# Patient Record
Sex: FEMALE | Race: WHITE | NOT HISPANIC OR LATINO | Employment: FULL TIME | URBAN - METROPOLITAN AREA
[De-identification: names, ages, dates, MRNs, and addresses within clinical notes are randomized per-mention and may not be internally consistent; named-entity substitution may affect disease eponyms.]

---

## 2023-04-06 LAB
ALANINE AMINOTRANSFERASE (SGPT) (U/L) IN SER/PLAS: 17 U/L (ref 7–45)
ALBUMIN (G/DL) IN SER/PLAS: 4.2 G/DL (ref 3.4–5)
ALKALINE PHOSPHATASE (U/L) IN SER/PLAS: 52 U/L (ref 33–110)
ANION GAP IN SER/PLAS: 14 MMOL/L (ref 10–20)
ASPARTATE AMINOTRANSFERASE (SGOT) (U/L) IN SER/PLAS: 17 U/L (ref 9–39)
BILIRUBIN TOTAL (MG/DL) IN SER/PLAS: 0.3 MG/DL (ref 0–1.2)
CALCIUM (MG/DL) IN SER/PLAS: 9.7 MG/DL (ref 8.6–10.6)
CARBON DIOXIDE, TOTAL (MMOL/L) IN SER/PLAS: 25 MMOL/L (ref 21–32)
CHLORIDE (MMOL/L) IN SER/PLAS: 104 MMOL/L (ref 98–107)
CREATININE (MG/DL) IN SER/PLAS: 0.85 MG/DL (ref 0.5–1.05)
ERYTHROCYTE DISTRIBUTION WIDTH (RATIO) BY AUTOMATED COUNT: 12.6 % (ref 11.5–14.5)
ERYTHROCYTE MEAN CORPUSCULAR HEMOGLOBIN CONCENTRATION (G/DL) BY AUTOMATED: 32.4 G/DL (ref 32–36)
ERYTHROCYTE MEAN CORPUSCULAR VOLUME (FL) BY AUTOMATED COUNT: 92 FL (ref 80–100)
ERYTHROCYTES (10*6/UL) IN BLOOD BY AUTOMATED COUNT: 4.48 X10E12/L (ref 4–5.2)
GFR FEMALE: 86 ML/MIN/1.73M2
GLUCOSE (MG/DL) IN SER/PLAS: 86 MG/DL (ref 74–99)
HEMATOCRIT (%) IN BLOOD BY AUTOMATED COUNT: 41.1 % (ref 36–46)
HEMOGLOBIN (G/DL) IN BLOOD: 13.3 G/DL (ref 12–16)
LEUKOCYTES (10*3/UL) IN BLOOD BY AUTOMATED COUNT: 8.7 X10E9/L (ref 4.4–11.3)
NRBC (PER 100 WBCS) BY AUTOMATED COUNT: 0 /100 WBC (ref 0–0)
PLATELETS (10*3/UL) IN BLOOD AUTOMATED COUNT: 306 X10E9/L (ref 150–450)
POTASSIUM (MMOL/L) IN SER/PLAS: 4.2 MMOL/L (ref 3.5–5.3)
PROTEIN TOTAL: 7 G/DL (ref 6.4–8.2)
SODIUM (MMOL/L) IN SER/PLAS: 139 MMOL/L (ref 136–145)
UREA NITROGEN (MG/DL) IN SER/PLAS: 16 MG/DL (ref 6–23)

## 2023-04-28 ENCOUNTER — HOSPITAL ENCOUNTER (OUTPATIENT)
Dept: DATA CONVERSION | Facility: HOSPITAL | Age: 43
End: 2023-04-28

## 2023-04-28 DIAGNOSIS — Z87.891 PERSONAL HISTORY OF NICOTINE DEPENDENCE: ICD-10-CM

## 2023-04-28 DIAGNOSIS — G56.01 CARPAL TUNNEL SYNDROME, RIGHT UPPER LIMB: ICD-10-CM

## 2023-04-28 LAB — HCG, URINE: NEGATIVE

## 2023-09-07 VITALS — WEIGHT: 205.47 LBS | BODY MASS INDEX: 33.02 KG/M2 | HEIGHT: 66 IN

## 2023-10-02 NOTE — OP NOTE
Post Operative Note:     PreOp Diagnosis: Right carpal tunnel syndrome   Post-Procedure Diagnosis: Right carpal tunnel syndrome   Procedure: 1.  Right carpal tunnel release of the  median nerve at the wrist  2.   3.   4.   5.   Surgeon: Dr. Carlos A Carroll MD   Resident/Fellow/Other Assistant: Roopa Ball   Anesthesia: [place] block with sedation   Estimated Blood Loss (mL): Less than 10 cc   Specimen: no   Complications: None   Findings: Right carpal tunnel syndrome   Patient Returned To/Condition: Stable   Tourniquet Times: 26 minutes     Operative Report Dictated:  Dictation: no       Electronic Signatures:  Carlos A Carroll)  (Signed 28-Apr-2023 14:54)   Authored: Post Operative Note, Note Completion      Last Updated: 28-Apr-2023 14:54 by Carlos A Carroll)

## 2024-04-19 NOTE — OP NOTE
PREOP DIAGNOSIS: Right carpal tunnel syndrome.      POSTOP DIAGNOSIS: Right carpal tunnel syndrome.      PROCEDURE: The patient underwent right carpal tunnel release of the median  nerve at the wrist.      SURGEON: Dr. Carlos A Carroll MD     ASSISTANT: Roopa Hyde.    ANESTHESIA:  [place] block with sedation.      1ST ASSISTANT:      EBL: Less than 10 mL.      CONDITION: Stable.      COMPLICATIONS: None.      SPECIMEN: None.      DETAILS OF PROCEDURE: The patient was identified preoperatively with her friend.  Risks and benefits of surgical intervention were once again reviewed with her.  Risks include anesthesia, infection, bleeding, injury to adjacent structures,  paralysis, paresthesias, dysfunction, deformity, scarring, recurrence,  nonresolution of symptoms, possible need for further surgical interventions  among others.  She understands her current clinical situation.  She understands  all of her treatment options.  She wished to proceed with surgical  intervention.  The right hand was appropriately identified and marked  preoperatively with a marking pen.  She received preoperative IV antibiotics.  SCDs were in place.  The appropriate preoperative safety checklist was  performed.  She was then taken to the operating room, placed in supine position  on the operating room table.  The Anesthesia Department then appropriately  performed [place] block anesthetic to the right upper extremity.  This involved  placement of dorsal right hand IV catheter, placement of proximal upper  extremity tourniquet, Esmarch exsanguination of right upper extremity,  inflation of the proximal upper extremity tourniquet, and instillation of the  anesthetic medication.  Once this was appropriately performed by the  Anesthesia Department, the right upper extremity was cleaned, prepped, and  draped in usual sterile fashion.  The entire procedure performed under loupe  magnification.  The appropriate mid central, longitudinal, palmar  incision  site was marked with a marking pen.  The right hand was appropriately  positioned on the hand table and retracted with the aluminum hand retracting  system.  The appropriate time-out procedure was performed.  The incision was  then made with a scalpel.  Subcutaneous tissue was dissected with tenotomy  scissors.  Excellent hemostasis was maintained with electrocautery.  A small  Heiss retractor and Ragnell retractors were employed to retract the skin  subcutaneous tissue.  Further dissection with tenotomy scissors was performed.  The palmar aponeurosis was identified.  This was then incised mid centrally  and longitudinally with the scalpel.  Further dissection with tenotomy  scissors was performed.  The transverse carpal ligament was identified.  This  was then incised mid centrally and longitudinally with the scalpel.  The  median nerve was identified directly underneath.  The mid central,  longitudinal, transverse carpal ligament incision was then extended distally  in a longitudinal fashion under direct vision with loupe magnification  employing the scalpel and tenotomy scissors.  Complete release from mid  central to distal was accomplished.  This was confirmed with the hemostat.  The mid central, longitudinal, transverse carpal ligament incision was then  extended in a longitudinal fashion proximally under direct vision with loupe  magnification employing the scalpel and tenotomy scissors.  Complete release    of the transverse carpal ligament was accomplished.  This was once again  confirmed with the hemostat.  The median nerve was further identified.  It was  briefly, gently, and meticulously dissected along its lateral margins.  No  other compressive pathologic entities were identified in the carpal tunnel.  The operative site area was then copiously irrigated with antibiotic solution.  The operative site was then injected in local circumferential fashion with  approximately 4 mL of 2% plain  lidocaine solution for local anesthetic effect.  The proximal upper extremity tourniquet was then released.  Total tourniquet  time was 26 minutes.  The entire hand and all 5 digits became pink, warm, and  had a capillary refill of 1 to 2 seconds upon tourniquet release.  Excellent  hemostasis was maintained with electrocautery.  Further copious irrigation  with antibiotic solution was performed.  Excellent hemostasis was noted.  The  wound edges were then reapproximated with 1 intermittent horizontal mattress  suture 4-0 nylon and a few simple interrupted sutures of 4-0 nylon.  The  operative site area was further cleaned and dried.  The operative site was  then further injected in local circumferential fashion with approximately 7 mL  of 0.25% plain Marcaine solution for prolonged postoperative pain relief.  The  right hand was further cleaned and dried.  Bacitracin, Xeroform, and  appropriate bulky protective sterile dressing was then fashioned and applied.  Distal tips of all 5 digits remained pink, warm, and had a capillary refill of  1 to 2 seconds after dressing application.  She tolerated the procedure very  well.  She awoke from anesthesia without difficulty and was transferred to the  recovery room in stable condition.     Electronic Signatures:  Carlos A Carroll)  (Signed on 28-Apr-2023 15:03)   Authored    Last Updated: 28-Apr-2023 15:03 by Carlos A Carroll)

## 2024-08-22 ENCOUNTER — HOSPITAL ENCOUNTER (OUTPATIENT)
Dept: RADIOLOGY | Facility: CLINIC | Age: 44
Discharge: HOME | End: 2024-08-22
Payer: COMMERCIAL

## 2024-08-22 VITALS — BODY MASS INDEX: 33.02 KG/M2 | HEIGHT: 66 IN | WEIGHT: 205.47 LBS

## 2024-08-22 DIAGNOSIS — Z12.31 SCREENING MAMMOGRAM FOR BREAST CANCER: ICD-10-CM

## 2024-08-22 PROCEDURE — 77067 SCR MAMMO BI INCL CAD: CPT

## 2024-08-28 ENCOUNTER — HOSPITAL ENCOUNTER (OUTPATIENT)
Dept: RADIOLOGY | Facility: EXTERNAL LOCATION | Age: 44
Discharge: HOME | End: 2024-08-28

## 2025-09-09 ENCOUNTER — APPOINTMENT (OUTPATIENT)
Dept: PRIMARY CARE | Facility: CLINIC | Age: 45
End: 2025-09-09
Payer: COMMERCIAL